# Patient Record
Sex: FEMALE | Race: WHITE
[De-identification: names, ages, dates, MRNs, and addresses within clinical notes are randomized per-mention and may not be internally consistent; named-entity substitution may affect disease eponyms.]

---

## 2020-05-14 NOTE — PR
[facility]
                                    [address]
                                  [place], [place]  [address]
_________________________________________________________________________________________
                                                                 Signed   
 
 
===================================
Progress Notes IP
===================================
Datetime Report Generated by JOSETTE: 05/14/2020 20:07
   
PROGRESS NOTES:  [id number]
Impression:  Normal progression of labor
Procedures:  Sterile Vag Exam
Plan:  Anesthesia consult
Other Plans:  trial hands and knees
Informed Consent Obtain:  Vaginal Delivery; Risks, Benefits and Alternatives Discussed
VITAL SIGNS:  [id number]
Vital Signs:  Reviewed; Within Normal Limits
EXAM:  [id number]
Dilatation:  9.0
Effacement:  80
Station:  -1
Uterine Contractions:  q 2 to6 min
MEMBRANES:  [id number]
Membrane Status:  Intact
Comments:  Progressing.  Will try different position to see if this will help with
   further dilation and continue observation of FHTs.
Fetus A:  [id number]
FHR Baseline:  145
Variability:  Moderate 6-25bpm
Accelerations:  10X10
Decelerations:  Late; Variable
FHR Category:  Category II
Presentation:  Vertex
Comments on Fetus A:  variability present though will continue close observation
Fetus B:  [id number]
Signing Physician:  Terri Lopez MD
 
 
Copies:                                
~
 
 
 
 
 
 
 
*Electronically Signed*  05/14/20 2007  TERRI LOPEZ MD            
                                                                       
_________________________________________________________________________________________
PATIENT NAME:     GREG TADEO                     
MEDICAL RECORD #: [id number]                     PROGRESS NOTE                 
          ACCT #: [id number]  
DATE OF BIRTH:   [birth year]                                       
PHYSICIAN:   TERRI LOPEZ MD                   RPT #: [id number]
REPORT IS CONFIDENTIAL AND NOT TO BE RELEASED WITHOUT AUTHORIZATION

## 2020-05-14 NOTE — PR
[facility]
                                    [address]
                                  [place], [place]  [address]
_________________________________________________________________________________________
                                                                 Signed   
 
 
===================================
Progress Notes IP
===================================
Datetime Report Generated by JOSETTE: 05/14/2020 19:13
   
PROGRESS NOTES:  [id number]
Impression:  Non-reassuring fetal heart rate
Procedures:  Sterile Vag Exam
Plan:  Anesthesia consult
Other Plans:  pit off, position changes
Informed Consent Obtain:  Vaginal Delivery; Risks, Benefits and Alternatives Discussed
VITAL SIGNS:  [id number]
Vital Signs:  Reviewed; Within Normal Limits
EXAM:  [id number]
Dilatation:  8.0
Effacement:  80
Station:  -2
Uterine Contractions:  q 2 to 5 min
MEMBRANES:  [id number]
Membrane Status:  Intact
Comments:  Recurrent decels and poor variability which is currently improved.  Will
   restart pitocin to see if baby will tolerate contractions.  She is comfortable again
   after a redose in epidural.  Will continue close observation.
Fetus A:  [id number]
FHR Baseline:  140
Variability:  Moderate 6-25bpm
Accelerations:  10X10
Decelerations:  Late
FHR Category:  Category II
Presentation:  Vertex
Comments on Fetus A:  recurrent variables _ poor variability which responded to position
   changes and D/C pit
Fetus B:  [id number]
Signing Physician:  Terri Lopez MD
 
 
Copies:                                
~
 
 
 
 
 
*Electronically Signed*  05/14/20 1913  TERRI LOPEZ MD            
                                                                       
_________________________________________________________________________________________
PATIENT NAME:     GREG TADEO                     
MEDICAL RECORD #: [id number]                     PROGRESS NOTE                 
          ACCT #: [id number]  
DATE OF BIRTH:   [birth year]                                       
PHYSICIAN:   TERRI LOPEZ MD                   RPT #: [id number]
REPORT IS CONFIDENTIAL AND NOT TO BE RELEASED WITHOUT AUTHORIZATION

## 2020-05-14 NOTE — PR
[facility]
                                    [address], [place]  [address]
_________________________________________________________________________________________
                                                                 Signed   
 
 
===================================
Progress Notes IP
===================================
Datetime Report Generated by JOSETTE: 05/14/2020 18:33
   
PROGRESS NOTES:  [id number]
Impression:  Normal progression of labor; Non-reassuring fetal heart rate
Procedures:  Sterile Vag Exam
Plan:  Anesthesia consult
Other Plans:  stop pit, redose epidural
Informed Consent Obtain:  Vaginal Delivery; Risks, Benefits and Alternatives Discussed
VITAL SIGNS:  P7390898
Vital Signs:  Reviewed; Within Normal Limits
EXAM:  [id number]
Dilatation:  8.0
Effacement:  80
Station:  -2
Uterine Contractions:  q 2 to 5 min
MEMBRANES:  [id number]
Membrane Status:  Intact
Comments:  Slowly progressing but with recurrent decels.  Will stop pit augmentation
   again and allow for recovery but restarting.  Pt updated about concerns of fetal
   tolerance for contractions.
Fetus A:  [id number]
FHR Baseline:  140
Variability:  Minimal - Undetectable to <5bpm
Accelerations:  15X15
Decelerations:  Late
FHR Category:  Category II
Presentation:  Vertex
Comments on Fetus A:  Recent lates with contractions but accel with exam which is
   reassuring
Fetus B:  [id number]
Signing Physician:  Terri Lopez MD
 
 
Copies:                                
~
 
 
 
 
 
*Electronically Signed*  05/14/20  1833  TERRI LOPEZ MD            
                                                                       
_________________________________________________________________________________________
PATIENT NAME:     GREG TADEO                     
MEDICAL RECORD #: [id number]                     PROGRESS NOTE                 
          ACCT #: [id number]  
DATE OF BIRTH:   [birth year]                                       
PHYSICIAN:   TERRI LOPEZ MD                   RPT #: [id number]
REPORT IS CONFIDENTIAL AND NOT TO BE RELEASED WITHOUT AUTHORIZATION

## 2020-05-14 NOTE — PR
[facility]
                                    [address], [place]  [address]
_________________________________________________________________________________________
                                                                 Signed   
 
 
===================================
Progress Notes IP
===================================
Datetime Report Generated by JOSETTE: 05/14/2020 13:07
   
PROGRESS NOTES:  [id number]
Impression:  Slow Progression of Labor
Procedures:  Intrauterine Pressure Catheter; Fetal Scalp Electrode
Plan:  Augmentation
Informed Consent Obtain:  Vaginal Delivery; Risks, Benefits and Alternatives Discussed
VITAL SIGNS:  A6536248
Vital Signs:  Reviewed; Within Normal Limits
EXAM:  [id number]
Dilatation:  3.5
Effacement:  90
Station:  -2
Uterine Contractions:  q 1 to 7 min
MEMBRANES:  [id number]
Membrane Status:  Intact
Comments:  Really no progress.  Will begin pit augment as contractions are inadequate at
   this time and no cervical change.
Fetus A:  [id number]
FHR Baseline:  140
Variability:  Moderate 6-25bpm
Accelerations:  15X15
Decelerations:  None
FHR Category:  Category I
Presentation:  Vertex
Comments on Fetus A:  No evidence of fetal metabolic acidosis
Fetus B:  [id number]
Signing Physician:  Terri Lopez MD
 
 
Copies:                                
~
 
 
 
 
 
 
 
 
*Electronically Signed*  05/14/20  1307  TERRI LOPEZ MD            
                                                                       
_________________________________________________________________________________________
PATIENT NAME:     GREG TADEO                     
MEDICAL RECORD #: [id number]                     PROGRESS NOTE                 
          ACCT #: [id number]  
DATE OF BIRTH:   [birth year]                                       
PHYSICIAN:   TERRI LOPEZ MD                   RPT #: [id number]
REPORT IS CONFIDENTIAL AND NOT TO BE RELEASED WITHOUT AUTHORIZATION

## 2020-05-15 NOTE — PR
[facility]
                                    [address]
                                  [place], [place]  [address]
_________________________________________________________________________________________
                                                                 Signed   
 
 
===================================
PP Progress Notes
===================================
Datetime Report Generated by JOSETTE: 05/15/2020 08:42
   
SUBJECTIVE:  [id number]
Pain:  Within normal limits
Vital Signs:  F8375001
Vital Signs:  Reviewed; Within Normal Limits
POSTPARTUM EXAM:  [id number]
Cardiovascular:  Not Done
Respiratory:  Not Done
Abdomen/Uterus:  Abnormal
Lochia:  Normal
Vulva/Perineum:  Not Done
Breasts:  Not Done
CVA Tenderness:  Not Done
Extremities:  Normal
 Incision:  Not Applicable
Breastfeeding Progress:  Normal
Exam Comments:  Fundus firm, NT @ U+1.
      
   H/H 10.7/33.7, WBC 23.4, plat 396k
IMPRESSION/PLAN/PROCEDURES:  [id number]
Impression:  Normal postpartum progression
Plan:  Continue present management
Procedures:  Rhogam
Progress Notes:  Doing well.  
Signing Physician:  Terri Lopez MD
 
 
Copies:                                
~
 
 
 
 
 
 
 
 
 
 
*Electronically Signed*  05/15/20  0842  TERRI LOPEZ MD            
                                                                       
_________________________________________________________________________________________
PATIENT NAME:     GREG TADEO                     
MEDICAL RECORD #: [id number]                     PROGRESS NOTE                 
          ACCT #: [id number]  
DATE OF BIRTH:   [birth year]                                       
PHYSICIAN:   TERRI LOPEZ MD                   RPT #: [id number]
REPORT IS CONFIDENTIAL AND NOT TO BE RELEASED WITHOUT AUTHORIZATION

## 2020-05-16 NOTE — PR
[facility]
                                    [address]
                                  [place], [place]  [address]
_________________________________________________________________________________________
                                                                 Signed   
 
 
===================================
PP Progress Notes
===================================
Datetime Report Generated by JOSETTE: 2020 09:39
   
SUBJECTIVE:  [id number]
Pain:  Within normal limits
Vital Signs:  W3151529
Vital Signs:  Reviewed; Within Normal Limits
POSTPARTUM EXAM:  [id number]
Cardiovascular:  Not Done
Respiratory:  Not Done
Abdomen/Uterus:  Abnormal
Lochia:  Normal
Vulva/Perineum:  Not Done
Breasts:  Not Done
CVA Tenderness:  Not Done
Extremities:  Normal
 Incision:  Not Applicable
Breastfeeding Progress:  Normal
Exam Comments:  Fundus firm, NT @ U-1.
IMPRESSION/PLAN/PROCEDURES:  [id number]
Impression:  Normal postpartum progression
Plan:  Discharge
Procedures:  Rhogam
Progress Notes:  Doing well.  She is ready for D/C.
Signing Physician:  Terri Lopez MD
 
 
Copies:                                
~
 
 
 
 
 
 
 
 
 
 
 
 
*Electronically Signed*  20  0939  TERRI LOPEZ MD            
                                                                       
_________________________________________________________________________________________
PATIENT NAME:     GREG TADEO                     
MEDICAL RECORD #: [id number]                     PROGRESS NOTE                 
          ACCT #: [id number]  
DATE OF BIRTH:   [birth year]                                       
PHYSICIAN:   TERRI LOPEZ MD                   RPT #: [id number]
REPORT IS CONFIDENTIAL AND NOT TO BE RELEASED WITHOUT AUTHORIZATION

## 2022-06-06 ENCOUNTER — HOSPITAL ENCOUNTER (INPATIENT)
Dept: HOSPITAL 46 - FBCO | Age: 22
LOS: 1 days | Discharge: HOME | End: 2022-06-07
Attending: OBSTETRICS & GYNECOLOGY | Admitting: OBSTETRICS & GYNECOLOGY
Payer: COMMERCIAL

## 2022-06-06 VITALS — WEIGHT: 201.94 LBS | HEIGHT: 64.02 IN | BODY MASS INDEX: 34.48 KG/M2

## 2022-06-06 DIAGNOSIS — Z20.822: ICD-10-CM

## 2022-06-06 DIAGNOSIS — O26.893: ICD-10-CM

## 2022-06-06 DIAGNOSIS — Z3A.38: ICD-10-CM

## 2022-06-06 DIAGNOSIS — Z67.11: ICD-10-CM

## 2022-06-06 DIAGNOSIS — Z87.891: ICD-10-CM

## 2022-06-06 PROCEDURE — U0003 INFECTIOUS AGENT DETECTION BY NUCLEIC ACID (DNA OR RNA); SEVERE ACUTE RESPIRATORY SYNDROME CORONAVIRUS 2 (SARS-COV-2) (CORONAVIRUS DISEASE [COVID-19]), AMPLIFIED PROBE TECHNIQUE, MAKING USE OF HIGH THROUGHPUT TECHNOLOGIES AS DESCRIBED BY CMS-2020-01-R: HCPCS

## 2022-06-06 PROCEDURE — 3E0R3BZ INTRODUCTION OF ANESTHETIC AGENT INTO SPINAL CANAL, PERCUTANEOUS APPROACH: ICD-10-PCS | Performed by: OBSTETRICS & GYNECOLOGY

## 2022-06-06 PROCEDURE — 0HQ9XZZ REPAIR PERINEUM SKIN, EXTERNAL APPROACH: ICD-10-PCS | Performed by: OBSTETRICS & GYNECOLOGY

## 2022-06-06 PROCEDURE — A9270 NON-COVERED ITEM OR SERVICE: HCPCS

## 2022-06-06 PROCEDURE — 3E0234Z INTRODUCTION OF SERUM, TOXOID AND VACCINE INTO MUSCLE, PERCUTANEOUS APPROACH: ICD-10-PCS | Performed by: OBSTETRICS & GYNECOLOGY

## 2022-06-06 PROCEDURE — 10907ZC DRAINAGE OF AMNIOTIC FLUID, THERAPEUTIC FROM PRODUCTS OF CONCEPTION, VIA NATURAL OR ARTIFICIAL OPENING: ICD-10-PCS | Performed by: OBSTETRICS & GYNECOLOGY

## 2022-06-06 PROCEDURE — 00HU33Z INSERTION OF INFUSION DEVICE INTO SPINAL CANAL, PERCUTANEOUS APPROACH: ICD-10-PCS | Performed by: OBSTETRICS & GYNECOLOGY

## 2022-06-06 NOTE — PR
[facility]
                                    [address]
                                  [place], [place]  [address]
_________________________________________________________________________________________
                                                                 Signed   
 
 
===================================
Progress Notes IP
===================================
Datetime Report Generated by CPN: 06/06/2022 07:57
   
PROGRESS NOTES:  O4949787
Impression:  Normal Progression of Labor
Procedures:  Artificial ROM; Sterile Vag Exam
Plan:  Continue Present Management
VITAL SIGNS:  B0960126
Vital Signs:  Reviewed; Within Normal Limits
EXAM:  [id number]
Dilatation:  4.0
Effacement:  75
Station:  -2
Contractions:  q 4 to 7 min
MEMBRANES:  [id number]
Comments:  Still comfortable but expect contractions to increase with AROM.  Will
   continue.
FETUS A:  K2127971
FHR Baseline:  140
Variability:  Moderate 6-25bpm
Accelerations:  15X15
Decelerations:  None
FHR Category:  Category I
Presentation:  Vertex
Comments on Fetus A:  No evidence of fetal metabolic acidosis
FETUS B:  [id number]
Signing Physician:  Terri Lopez MD
 
 
Copies:                                
~
 
 
 
 
 
 
 
 
 
 
*Electronically Signed*  06/06/22  0757  TERRI LOPEZ MD            
                                                                       
_________________________________________________________________________________________
PATIENT NAME:     GREG TADEO                 
MEDICAL RECORD #: [id number]                     PROGRESS NOTE                 
          ACCT #: [id number]  
DATE OF BIRTH:   [birth year]                                       
PHYSICIAN:   TERRI LOPEZ MD                   RPT #: [id number]
REPORT IS CONFIDENTIAL AND NOT TO BE RELEASED WITHOUT AUTHORIZATION

## 2022-06-07 NOTE — PR
[facility]
                                    [address] [place] [address]
                                  [place], [place]  [address]
_________________________________________________________________________________________
                                                                 Signed   
 
 
===================================
PP Progress Notes
===================================
Datetime Report Generated by CPPARMINDER: 2022 07:52
   
SUBJECTIVE:  [id number]
Pain:  Within Normal Limits
Vital Signs:  A0714664
Vital Signs:  Reviewed; Within Normal Limits
Cardiovascular:  Not Done
Respiratory:  Not Done
Abdomen/Uterus:  Abnormal
Lochia:  Normal
Vulva/Perineum:  Not Done
Breasts:  Not Done
CVA Tenderness:  Not Done
Extremities:  Normal
 Incision:  Not Applicable
Breastfeeding Progress:  Normal
Exam Comments:  Fundus firm, NT @ U-2
      
   H/H 11.3/34.8, WBC 17.7, plat 429k
IMPRESSION/PLAN/PROCEDURES:  [id number]
Impression:  Normal Postpartum Progression
Plan:  Discharge
Procedures:  Rhogam
Progress Notes:  Doing well.  She desires discharge today.
Signing Physician:  Terri Lopez MD
 
 
Copies:                                
~
 
 
 
 
 
 
 
 
 
 
 
*Electronically Signed*  22  0752  TERRI LOPEZ MD            
                                                                       
_________________________________________________________________________________________
PATIENT NAME:     GREG TADEO                 
MEDICAL RECORD #: [id number]                     PROGRESS NOTE                 
          ACCT #: [id number]  
DATE OF BIRTH:   [birth year]                                       
PHYSICIAN:   TERRI LOPEZ MD                   RPT #: [id number]
REPORT IS CONFIDENTIAL AND NOT TO BE RELEASED WITHOUT AUTHORIZATION

## 2023-10-29 ENCOUNTER — HOSPITAL ENCOUNTER (OUTPATIENT)
Dept: HOSPITAL 46 - ED | Age: 23
Setting detail: OBSERVATION
LOS: 2 days | Discharge: HOME | End: 2023-10-31
Attending: SURGERY | Admitting: SURGERY
Payer: COMMERCIAL

## 2023-10-29 VITALS — BODY MASS INDEX: 40.86 KG/M2 | HEIGHT: 60 IN | WEIGHT: 208.12 LBS

## 2023-10-29 DIAGNOSIS — K35.80: Primary | ICD-10-CM

## 2023-10-29 LAB
ALBUMIN SERPL-MCNC: 4.1 G/DL (ref 3.4–5)
ALBUMIN/GLOB SERPL: 1.03 {RATIO} (ref 1.1–2.4)
ALP SERPL-CCNC: 109 U/L (ref 46–116)
ALT SERPL W P-5'-P-CCNC: 19 U/L (ref 14–59)
ANION GAP SERPL CALCULATED.4IONS-SCNC: 10.2 MMOL/L (ref 7–21)
AST SERPL-CCNC: 12 U/L (ref 15–37)
BASOPHILS NFR BLD AUTO: 0.8 % (ref 0–2)
BUN SERPL-MCNC: 9 MG/DL (ref 7–18)
BUN/CREAT SERPL: 10 (ref 6–28.6)
CALCIUM SERPL-MCNC: 9 MG/DL (ref 8.5–10.1)
CHLORIDE SERPL-SCNC: 101 MMOL/L (ref 98–107)
CO2 SERPL-SCNC: 26 MMOL/L (ref 21–32)
DEPRECATED RDW RBC AUTO: 14.3 FL (ref 10.5–15)
EGFRCR SERPLBLD CKD-EPI 2021: 91 ML/MIN (ref 60–?)
EOSINOPHIL NFR BLD AUTO: 0.2 % (ref 0–6)
GLOBULIN SER-MCNC: 4 G/DL (ref 1.8–3.5)
HCT VFR BLD AUTO: 38.6 % (ref 35–50)
HGB BLD-MCNC: 12.6 G/DL (ref 12–18)
LYMPHOCYTES NFR BLD AUTO: 17.9 % (ref 24–44)
MCH RBC QN AUTO: 27.6 PG (ref 27–36)
MCHC RBC AUTO-ENTMCNC: 32.7 G/DL (ref 30–36)
MCV RBC AUTO: 84.4 FL (ref 81–99)
MONOCYTES NFR BLD AUTO: 6.7 % (ref 0–12)
NEUTROPHILS NFR BLD AUTO: 74.4 % (ref 39–80)
PLATELET # BLD AUTO: 422 K/UL (ref 140–440)
POTASSIUM SERPL-SCNC: 3.2 MMOL/L (ref 3.5–5.1)
PROT SERPL-MCNC: 8.1 G/DL (ref 6.4–8.2)
RBC # BLD AUTO: 4.58 M/UL (ref 4.3–5.7)

## 2023-10-29 PROCEDURE — C9803 HOPD COVID-19 SPEC COLLECT: HCPCS

## 2023-10-29 PROCEDURE — U0002 COVID-19 LAB TEST NON-CDC: HCPCS

## 2023-10-30 VITALS — DIASTOLIC BLOOD PRESSURE: 60 MMHG | SYSTOLIC BLOOD PRESSURE: 104 MMHG

## 2023-10-30 VITALS — SYSTOLIC BLOOD PRESSURE: 119 MMHG | DIASTOLIC BLOOD PRESSURE: 52 MMHG

## 2023-10-30 VITALS — DIASTOLIC BLOOD PRESSURE: 56 MMHG | SYSTOLIC BLOOD PRESSURE: 111 MMHG

## 2023-10-30 VITALS — DIASTOLIC BLOOD PRESSURE: 54 MMHG | SYSTOLIC BLOOD PRESSURE: 115 MMHG

## 2023-10-30 VITALS — DIASTOLIC BLOOD PRESSURE: 62 MMHG | SYSTOLIC BLOOD PRESSURE: 128 MMHG

## 2023-10-30 VITALS — DIASTOLIC BLOOD PRESSURE: 49 MMHG | SYSTOLIC BLOOD PRESSURE: 109 MMHG

## 2023-10-30 VITALS — DIASTOLIC BLOOD PRESSURE: 50 MMHG | SYSTOLIC BLOOD PRESSURE: 105 MMHG

## 2023-10-30 VITALS — SYSTOLIC BLOOD PRESSURE: 119 MMHG | DIASTOLIC BLOOD PRESSURE: 75 MMHG

## 2023-10-30 LAB
ALBUMIN SERPL-MCNC: 3.3 G/DL (ref 3.4–5)
ALBUMIN/GLOB SERPL: 0.97 {RATIO} (ref 1.1–2.4)
ALP SERPL-CCNC: 90 U/L (ref 46–116)
ALT SERPL W P-5'-P-CCNC: 14 U/L (ref 14–59)
ANION GAP SERPL CALCULATED.4IONS-SCNC: 9.3 MMOL/L (ref 7–21)
AST SERPL-CCNC: 9 U/L (ref 15–37)
BASOPHILS NFR BLD AUTO: 0.4 % (ref 0–2)
BUN SERPL-MCNC: 9 MG/DL (ref 7–18)
BUN/CREAT SERPL: 10.46 (ref 6–28.6)
CALCIUM SERPL-MCNC: 8.7 MG/DL (ref 8.5–10.1)
CHLORIDE SERPL-SCNC: 104 MMOL/L (ref 98–107)
CO2 SERPL-SCNC: 26 MMOL/L (ref 21–32)
DEPRECATED RDW RBC AUTO: 14.3 FL (ref 10.5–15)
EGFRCR SERPLBLD CKD-EPI 2021: 96 ML/MIN (ref 60–?)
EOSINOPHIL NFR BLD AUTO: 0.4 % (ref 0–6)
EPI CELLS #/AREA URNS HPF: (no result) /LPF
GLOBULIN SER-MCNC: 3.4 G/DL (ref 1.8–3.5)
HCT VFR BLD AUTO: 33.8 % (ref 35–50)
HGB BLD-MCNC: 11 G/DL (ref 12–18)
HGB UR QL STRIP: (no result)
KETONES UR QL STRIP: (no result)
LEUKOCYTE ESTERASE UR QL STRIP: (no result)
LYMPHOCYTES NFR BLD AUTO: 21.4 % (ref 24–44)
MCH RBC QN AUTO: 27.8 PG (ref 27–36)
MCHC RBC AUTO-ENTMCNC: 32.6 G/DL (ref 30–36)
MCV RBC AUTO: 85.4 FL (ref 81–99)
MONOCYTES NFR BLD AUTO: 7.6 % (ref 0–12)
NEUTROPHILS NFR BLD AUTO: 70.2 % (ref 39–80)
NITRITE UR QL STRIP: NEGATIVE
PLATELET # BLD AUTO: 375 K/UL (ref 140–440)
POTASSIUM SERPL-SCNC: 3.3 MMOL/L (ref 3.5–5.1)
PROT SERPL-MCNC: 6.7 G/DL (ref 6.4–8.2)
RBC # BLD AUTO: 3.96 M/UL (ref 4.3–5.7)

## 2023-10-30 PROCEDURE — 0DTJ0ZZ RESECTION OF APPENDIX, OPEN APPROACH: ICD-10-PCS | Performed by: SURGERY

## 2023-10-30 NOTE — NUR
PT AWAKE, ROOM AIR, DENIES C/O N/V OR ABD PAIN. IVF INFUSING, COOP WITH
VITALS. UP TO BRP, VOIDED DARK COLORED YELLOW URINE. BACK TO BED. TOLERATED
WELL. NPO, ORAL SWABS AT HANDS REACH

## 2023-10-30 NOTE — NUR
PT WAKES TO VOICE. DENIES PAIN OR NAUSEA. WATCHES TV AND VISITS WITH SPOUSE.
NO CHANGES TO ABDOMINAL INCISIONS X3. CALL LIGHT IN REACH, NO REQUESTS AT THIS
TIME.

## 2023-10-30 NOTE — NUR
REPORT RECEIVED FROM HIMANSHU DOYLE. pt RESTING IN BED WITH EYES CLOSED. .
SPO2 95% ON RA. NO DISTRESS NOTED. IVF INFUSING AS ORDERED.

## 2023-10-30 NOTE — OR
[facility]
                                    [address], [place]  [address]
_________________________________________________________________________________________
                                                                 Signed   
 
 
DATE OF OPERATION:
10/30/2023
 
SURGEON:
Ruben Balderas MD
 
PREOPERATIVE DIAGNOSIS:
Acute appendicitis.
 
POSTOPERATIVE DIAGNOSES:
1. Acute appendicitis without perforation.
2. Abdominal obesity.
 
PROCEDURE:
Laparoscopic appendectomy.
 
ANESTHESIA:
General endotracheal, Ruben Saavedra CRNA and local 10 mL of 0.25% Marcaine with
epinephrine. 
 
INDICATION:
This is a 23-year-old G2 white woman, who presented to the emergency room late last
night and evaluated by Dr. Anderson with progressive pain in the lower abdomen.  It
began at 2:00 a.m. the night before in the upper abdomen.  Her white count was elevated
to 14.5 and a CT scan of the abdomen confirmed findings consistent with acute
appendicitis.  Her beta HCG is negative and urinalysis normal.  She has been fluid
resuscitated, given intravenous antibiotics today and now to undergo appendectomy
preferred by a laparoscopic approach.  The risk of bleeding, infection, need for other
indicated procedures, need for open procedure and so forth all reviewed with her. She
understands and wished to proceed. 
 
FINDINGS:
Indeed she did have acute suppurative appendicitis, but without sign of perforation of
well-formed abscess. 
Appendectomy was performed without problem.  The terminal ileum was normal.  The liver
was normal.  The gallbladder appeared to have chronic inflammatory change, but no acute
inflammation at this time.  Appendectomy was performed without complication and without
significant blood loss. 
 
DESCRIPTION OF PROCEDURE:
The patient was brought to the operating room, given a general endotracheal anesthetic.
Preoperative antibiotic Ancef and Flagyl had been given.  Sequential compression device
 
    Electronically Signed By: RUBEN BALDERAS MD  10/30/23 2008
_________________________________________________________________________________________
PATIENT NAME:     GREG TADEO                  
MEDICAL RECORD #: [id number]            OPERATIVE REPORT              
          ACCT #: [id number]  
DATE OF BIRTH:   [birth year]            REPORT #: [id number]      
PHYSICIAN:        RUBEN BALDERAS MD                 
PCP:              NO PRIMARY CARE PHYSICIAN     
REPORT IS CONFIDENTIAL AND NOT TO BE RELEASED WITHOUT AUTHORIZATION
 
 
                                  [facility]
                                    [address], [place]  [address]
_________________________________________________________________________________________
                                                                 Signed   
 
 
stockings were used and heparin subcutaneously administered.  After satisfactory general
endotracheal anesthesia, the abdomen was prepared with a chlorhexidine solution and
draped sterilely.  An infraumbilical incision was made and using an open Lio cannula
technique, pneumoperitoneum was achieved to a level of 14 mmHg of carbon dioxide gas.
Intra-abdominal inspection showed no sign of ascites or carcinomatosis.  The small bowel
loops in the right lower abdomen are rather inflamed. Appendix at that time was not
visualized.  The liver appeared normal.  There appeared to be chronic inflammatory
change of the gallbladder.  There was no sign of acute inflammation. 
 
Under direct visualization, a 12 mm epigastric port was placed and a camera WAS placed
to that site.  Single hand manipulation of the right lower abdomen allowed for
identification of the cecum and the ileum easily identifying a suppurative thickened
appendix clearly with appendicitis, but no sign of perforation or actual abscess. 
 
The right lower quadrant 5 mm port was placed and with two hand manipulation window
created between the appendix and the cecum.  Endo-GURWINDER stapling device with vascular load
was used to transect the base of the appendix flushed with the cecum. Mobility of the
antimesenteric fat pad of trieve from the mesentery of the appendix itself was
undertaken and once isolated, an Endo GURWINDER load was applied across this and another load
to fully complete the transection.  There was no bleeding particularly.  The appendix
was placed in an endobag, given her abdominal wall obesity and purulent appearance.
This was extracted and revealed on the back table and later photographed. 
 
Irrigation was undertaken of the right lower quadrant.  There was minimal oozing at the
staple line and one of the sites and this was easily secured with electrocautery.
Excess irrigation fluid was suctioned free.  The camera was replaced to the
infraumbilical port.  Examination of the upper abdomen undertaken allowing for
suctioning of excess irrigation fluid.  Better visualization of the gallbladder was
undertaken showing chronic inflammatory change with no sign of acute inflammation. 
 
The epigastric port was removed and it showed a small amount of oozing and on that basis
direct application of cautery through the trocar site was used to secure getting good
hemostasis.  The right lower quadrant trocar was subsequently removed under direct
visualization showing no sign of bleeding. 
 
The infraumbilical fascial incision was then reapproximated with interrupted 0 Vicryl
suture.  Additionally, supported by a running 0 PDS suture.  Irrigation was undertaken
in the wound.  10 mL of 0.25% Marcaine with epinephrine injected locally and skin closed
with interrupted 3-0 Vicryl.  Steri-Strips were applied.  The patient was ultimately
extubated and transferred to recovery room in good condition having suffered no
complications.  Sponge, needle, and instrument counts were reported as correct x3. 
 
 
    Electronically Signed By: RUBEN BALDERAS MD  10/30/23 2008
_________________________________________________________________________________________
PATIENT NAME:     GREG TADEO                  
MEDICAL RECORD #: [id number]            OPERATIVE REPORT              
          ACCT #: [id number]  
DATE OF BIRTH:   [birth year]            REPORT #: [id number]      
PHYSICIAN:        RUBEN BALDERAS MD                 
PCP:              NO PRIMARY CARE PHYSICIAN     
REPORT IS CONFIDENTIAL AND NOT TO BE RELEASED WITHOUT AUTHORIZATION
 
 
                                  [address]  [address]
_________________________________________________________________________________________
                                                                 Signed   
 
 
 
 
            ________________________________________
            MD PAYAL Kay/MODL
Job #:  [id number]
DD:  10/30/2023 16:49:12
DT:  10/30/2023 17:20:48
 
cc:            Dr. Luiz Anderson
 
 
Copies:                                
~
 
 
 
 
 
 
 
 
 
 
 
 
 
 
 
 
 
 
 
 
 
 
 
 
 
 
 
    Electronically Signed By: RUBEN BALDERAS MD  10/30/23 2008
_________________________________________________________________________________________
PATIENT NAME:     CARLYLEGREG FOSTER                  
MEDICAL RECORD #: [id number]            OPERATIVE REPORT              
          ACCT #: [id number]  
DATE OF BIRTH:   [birth year]            REPORT #: [id number]      
PHYSICIAN:        RUBEN BALDERAS MD                 
PCP:              NO PRIMARY CARE PHYSICIAN     
REPORT IS CONFIDENTIAL AND NOT TO BE RELEASED WITHOUT AUTHORIZATION

## 2023-10-30 NOTE — NUR
VERBAL REPORT RECEIVED FROM HIMANSHU COLLAZO. PT RESTING IN BED WITH EYES CLOSED,
RESP EVEN AND UNLABORED.

## 2023-10-30 NOTE — NUR
pt SLEEPING, AWAKENS TO VOICE. ASSESSMENT COMPLETE. STERI STRIPS IN PLACE,
LAP SITES X3, MINIMAL DRIED SANGUINOUS
DRAINAGE ON UMBILICUS AND EPIGASTRIC SITES. SBA TO BSC FOR VOID AND BACK TO
BED. VS STABLE. CALL LIGHT AND PERSONAL SUPPLIES IN REACH. ICE WATER PROVIDED.

## 2023-10-30 NOTE — NUR
PT ADMITTED TO SOME NERVOUSNESS ABOUT SURGERY BUT WAS CONFIDENT IN CARE TO BE
RECEIVED.  GAVE REASSURANCE.  PT CONSENTED TO PRAYER.  PRAYED FOR SUCCESSFUL
PROCEDURE AND AWARENESS OF DIVINE PRESENCE.

## 2023-10-30 NOTE — NUR
POST OP VS COMPLETE.  PT STATES NO PAIN AT THIS TIME.  RA, SATS IN THE 90'S.
LAP SITES CONTINUE COVERED WITH SS,

## 2023-10-30 NOTE — HP
[facility]
                                    [address], [place]  [address]
_________________________________________________________________________________________
                                                                 Signed   
 
 
ADMISSION DATE:  
10/29/2023
 
REASON FOR ADMISSION:  
Probable appendicitis.
 
HISTORY OF PRESENT ILLNESS:  
This 23-year-old white woman who is  and accompanied by her  and has two
children, ages 3 and 1.  She lives in [place], [place]. 
 
At 2:30 a.m. more than 24 hours ago, she began having central upper abdominal pain,
which was unrelenting.  She tried Pepto-Bismol, which was somewhat helpful, but then
pain migrated to the lower abdomen, both right and left lower abdomen area.  She
presented to the emergency room at approximately 10:30 last night where she was
evaluated by Dr. Anderson.  This included an ultrasound of the abdomen confirming high
probability of acute appendicitis.  There is no evidence of bowel obstruction, terminal
ileitis, or other abnormality.  Her white count was elevated to 19.4, and urinalysis was
essentially negative.  Beta hCG was reported as negative as well.  She has an impending
menstrual period soon she thinks as well.  She was admitted for further evaluation and
care. 
 
PAST MEDICAL HISTORY:  
Notable only for childbirth.
 
ALLERGIES:  
Allergies to penicillin and self-described allergy to Benadryl.
 
PAST SURGICAL HISTORY:  
She has never had surgery in the past.
 
SOCIAL HISTORY:  
She does not smoke.  She does not work outside the home.
 
REVIEW OF SYSTEMS:  
Denies any shortness of breath or chest pain.  She has had no dysphagia, dysuria,
hematemesis or hematuria. 
 
PHYSICAL EXAMINATION:
GENERAL:  Somewhat obese white woman accompanied by her  at this time.  She does
not look systemically toxic. 
VITAL SIGNS:  Temperature at presentation was 98.1, pulse 117, blood pressure 124/79, O2
saturation 99% on room air.  Current vital signs show a temperature of 98, pulse 92,
 
    Electronically Signed By: RUBEN BALDERAS MD  10/30/23 2008
_________________________________________________________________________________________
PATIENT NAME:     GREG TADEO                  
MEDICAL RECORD #: [id number]            HISTORY AND PHYSICAL          
          ACCT #: [id number]  
DATE OF BIRTH:   [birth year]            REPORT #: [id number]      
PHYSICIAN:        RUBEN BALDERAS MD                 
PCP:              NO PRIMARY CARE PHYSICIAN     
REPORT IS CONFIDENTIAL AND NOT TO BE RELEASED WITHOUT AUTHORIZATION
 
 
                                  [facility]
                                    [address], [place]  [address]
_________________________________________________________________________________________
                                                                 Signed   
 
 
blood pressure 119/52, O2 saturation on room air is 98%. 
HEENT:  Trachea is midline.  Mucous membranes are moist. 
CHEST:  Clear. 
HEART:  Regular.  I do not detect a murmur, though the patient describes prior history
of having murmur./ 
ABDOMEN:  Somewhat obese.  Rovsing sign is positive.  There is tenderness in the right
lower quadrant. 
EXTREMITIES:  Show no clubbing, cyanosis, or edema.
 
LABORATORY STUDIES:  
Show an initial white count of 19.4, this morning 14.5; hematocrit of 33.8, previously
38.6; platelets 375,000.  Chem profile shows potassium now 3.3, otherwise electrolytes
are normal.  Liver enzymes are normal.  Beta hCG negative.  Urinalysis showed a trace of
ketones, otherwise normal.  Serology negative for Corona virus.  I have reviewed the CT
scan in detail and reviewed the report as well.  I faintly see what likely is the
appendix, which is oriented medially and in the posterior aspect of the cecum.
Interpretation of the CT showed findings suspicious for acute uncomplicated appendicitis
without sign of bowel obstruction.  The appendix is considered dilated within mild
adjacent inflammatory stranding.  Appendix measuring 12 mm in size. 
 
ASSESSMENT:  
The patient likely has acute non-perforated appendicitis.  Discussed the pathophysiology
of the problem with the patient and her , they understand.  I have recommended an
appendectomy.  Non-operative approaches are consideration, but less likely would be
durably effective and on that basis my recommendation is primarily appendectomy.  The
laparoscopic approach would be preferred though an open approach may be required.  I
discussed the risk of bleeding, infection, missed diagnosis, failure of diagnosis and
need for other indicated procedures, particularly if appendix is normal.  I would still
recommend it to be removed and evaluation and treatment of other findings if necessary.
They agreed to this. 
 
PLAN:  
We will provide for laparoscopy and laparoscopic appendectomy today.
 
 
 
            ________________________________________
            MD PAYAL Kay/ASHLIE
Job #:  [id number]
 
    Electronically Signed By: RUBEN BALDERAS MD  10/30/23 2008
_________________________________________________________________________________________
PATIENT NAME:     GREG TADEO                  
MEDICAL RECORD #: [id number]            HISTORY AND PHYSICAL          
          ACCT #: [id number]  
DATE OF BIRTH:   [birth year]            REPORT #: [id number]      
PHYSICIAN:        RUBEN BALDERAS MD                 
PCP:              NO PRIMARY CARE PHYSICIAN     
REPORT IS CONFIDENTIAL AND NOT TO BE RELEASED WITHOUT AUTHORIZATION
 
 
                                  [facility]
                                    [address], [place]  [address]
_________________________________________________________________________________________
                                                                 Signed   
 
 
DD:  10/30/2023 09:28:21
DT:  10/30/2023 09:56:50
 
cc:            Dr. Anderson
 
 
Copies:                                
~
 
 
 
 
 
 
 
 
 
 
 
 
 
 
 
 
 
 
 
 
 
 
 
 
 
 
 
 
 
 
 
 
 
 
 
    Electronically Signed By: RUBEN BALDERAS MD  10/30/23 2008
_________________________________________________________________________________________
PATIENT NAME:     GREG TADEO                  
MEDICAL RECORD #: [id number]            HISTORY AND PHYSICAL          
          ACCT #: [id number]  
DATE OF BIRTH:   [birth year]            REPORT #: [id number]      
PHYSICIAN:        RUBEN BALDERAS MD                 
PCP:              NO PRIMARY CARE PHYSICIAN     
REPORT IS CONFIDENTIAL AND NOT TO BE RELEASED WITHOUT AUTHORIZATION

## 2023-10-30 NOTE — NUR
10/30/23 1648 Opal Gould
1640-PT ARRIVES TO PACU ON 6 L MASK WITH ORAL AIRWAY IN PLACE. PT
UNRESPONSIVE TO PAINFUL/VERBAL STIMULI. CRNA POZAR AT BEDSIDE FOR
REPORT. S
 
4682-[facility] REMAINS IN PLACE

## 2023-10-30 NOTE — NUR
SPOKE TO TELEPHARMACY REGARDING CONFLICT NOTED W/ ANCEF ABX. PER TELEPHARMACY
OKAY TO NICK W/ SCHEDULED ADMINISTRATION, CONFLICT D/T ADVERSE REACTION ON
PENICILLINS- MONITOR FOR CROSS SENSITIVITY W/ ANCEF.

## 2023-10-30 NOTE — NUR
Called [facility].  They request I call [facility] as
they do not have any openings.
 
Sent request to [facility] requesting PCP and emailed the
chart to Lashell Zaman CHW and Kori Everett W requesting a pcp for this pt.

## 2023-10-30 NOTE — NUR
PRE-SURGICAL CHLORHEXADINE CLEANSE PROVIDED, NEW GOWN DONED. PT MENSTRATING,
BREIF AND PAD PROVIDED. PT TO BATHROOM WITH STANDBY ASSIST FOR LINES. PT VOIDS
AND APPLIES BREIF AND PAD. 150MLS OF ORANGE CONCENTRATED URINE NOTED. PT BACK
TO BED. NO REQUESTS AT THIS TIME.

## 2023-10-30 NOTE — NUR
1744- PT RETURNS TO MED-SURG, ROOM 8 VIA BED, ESCORTED BY HIMANSHU GRAJEDA. VS
OBTAINED, STABLE. PULSE TACHY  S/P EPHEDRINDE IN THE OR. CONTINUOUS
PULSE
OXIMETER IN PLACE. PT DROWSY BUT RESPONDS TO VERBAL CUES AND CAN FOLLOW
INSTRUCTIONS BUT THEN GOES BACK TO RESTING WITH EYES CLOSED, RESP EVEN AND
UNLABORED. SPOUSE AT BEDSIDE IN ROOM. SCD'S IN PLACE FROM KNEES TO ANKLES ON
BLE. HRR, LUNGS CLEAR, ABDOMEN MILDLY DISTENDED AND SWOLLEN. INCISION SITES
X3: EPIGASTRIC REGION, AT UMBILICUS AND RLQ. STERI STRIPS IN PLACE OVER EACH
SITE. SMALL AMOUNT OF SEROSANGUINEOUS DRAINAGE NOTED AT UMBILICUS AND
EPIGASTRIC REGION, PER VERBAL REPORT FROM HIMANSHU GRAJEDA, THIS DRAINAGE IS
UNCHANGED SINCE HER ARRIVAL TO PACU. NO ACTIVE DRAINING AT THIS TIME. 20G IV
IN RAC PATENT, LR AT 85 INFUSING. NO REDNESS, SWELLING OR LEAKING NOTED AT
SITE. NO REQUESTS AT THIS TIME.

## 2023-10-30 NOTE — NUR
Spoke with Liliana.  She states she currently lives with her spouse and 2
children with her parents in [place]. They live in  house, no issues getting in
or out of the home.  Pt does not use any DME.  She does not drive, but family
transport her.  She does not have a PCP, but is will to have a pcp from
[facility] or [facility]. I will call both to check
for availability, Pt denies other needs. She denies financial difficulties as
spouse is working.  She is a stay at home mom. She plans on dc to home as soon
as she is medically cleared.

## 2023-10-30 NOTE — NUR
pt ARRIVED TO [facility] FLOOR, ORIENTED TO ROOM. VSS, CALL LIGHT IN REACH. pt
CURRENTLY DENIES PAIN. pt NPO FOR SCHEDULED APPY. CALL LIGHT IN REACH. 
GORGE AT BEDSIDE, PREPARING TO GO HOME FOR THE EVENING. CHARGE HIMANSHU DIAZ
COMPLETING ADMISSION.

## 2023-10-30 NOTE — NUR
POST OP VS COMPLETE. pt RESTING IN BED. CONTINUES TO DENY PAIN. STERI STRIPS
INTACT ON LAP SITES X3, NO NEW DRAINAGE. SCHEDULED MEDICATIONS ADMINISTERED.
CALL LIGHT IN REACH.

## 2023-10-30 NOTE — NUR
0100 - ADMITTED TO ROOM 108 FROM ED, VIA STRETCHER, AMBULATED FROM HALLWAY TO
BED, ALERT AND ORIENTED, COOPERATIVE WITH ADMIT ASSESSMENTS AND QUESTIONS,
DENIES C/O PAIN OR N/V AT THIS TIME, COMPLETED ER FLAGYL. DENIES S/E TO ABX,
IVF INFUSING. ORIENTED TO ROOM, AWARE OF NPO STATUS, INSTRUCTIONS ON ORAL
SWABS AND PRE-POST OP EXPECTATIONS, QUESTIONS ANSWERED. COOPERATIVE,
RECEPTIVE. SWABS AND CALL LIGHT AT HANDS REACH.

## 2023-10-31 VITALS — SYSTOLIC BLOOD PRESSURE: 123 MMHG | DIASTOLIC BLOOD PRESSURE: 63 MMHG

## 2023-10-31 VITALS — SYSTOLIC BLOOD PRESSURE: 116 MMHG | DIASTOLIC BLOOD PRESSURE: 55 MMHG

## 2023-10-31 NOTE — NUR
PT EATS 100% OF BREAKFAST, TOLERATES THIS WELL, DENIES NAUSEA. ORAL PAIN
MEDICATION RECIEVED, SEE eMAR. PT WATCHES TV, SPOUSE AND YOUNG DAUGHTER AT
BEDSIDE. FLAGYL INFUSING VIA IV. NO REQUESTS AT THIS TIME.

## 2023-10-31 NOTE — NUR
STUDENT NURSE NOTE
PT. CALLED REQUESTED ASSISTANCE WITH IV PUMP AND TO ORDER BREAKFAST. IV PUMP
HAD DISTAL OCCLUSION, IV SITE OBSERVED AND INSPECTED FOR SIGNS OF INFILTRATION
AND PHLEBITIS. NO SIGNS OBSERVED. IV FLUIDS RE-STARTED, PT. PRIMARY NURSE
NOTIFIED. PT ASSISTED WITH BREAKFAST ORDER. CALL LIGHT WITHIN REACH. NO OTHER
NEEDS AT THIS TIME.

## 2023-10-31 NOTE — NUR
VERBAL REPORT RECEIVED FROM HIMANSHU BYRNE AND HIMANSHU LR. PT AWAKE AND ALERT,
WATCHES TV, REPORTS ABDOMINAL PAIN 1/10, TOLERABLE AT THIS TIME AND DENIES
NAUSEA. NO REQUESTS.

## 2023-10-31 NOTE — NUR
IV IN RAC REMOVED, TIP INTACT. GAUZE AND TAPE DRESSING APPLIED TO SITE. VSS.
PT DENIES NAUSEA OR PAIN. PT DRESSES SELF. PT ESCORTED OFF UNIT IN WHEEL CHAIR
PRIVATE CAR DRIVEN BY SPOUSE.

## 2023-10-31 NOTE — NUR
pt SLEEPING, AWAKENS TO VOICE. IV SITE ASSESSED, IV ANTIBIOTIC INFUSING WNL.
pt DENIES PAIN. SBA TO RESTROOM FOR VOID AND BACK TO BED. CALL LIGHT IN REACH.
BREAKFAST MENU AND PHONE PROVIDED TO PATIENT.

## 2023-10-31 NOTE — NUR
CALL LIGHT ANSWERED. IV PUMP ALARMING DISTAL OCCLUSION. SITE ASSESSED, IVF
INFUSING WNL AS ORDERED. VSS. ASSESSMENT COMPLETE. pt CONTINUES TO DENY PAIN.
UMBILICUS LAP SITE DRAINING SMALL AMT SANGUINOUS DRAINAGE THROUGH TO GOWN,
DRESSING REINFORCED WITH NON-ADHERANT GAUZE. NEW GOWN APPLIED. SBA TO RESTROOM
FOR VOID AND BACK TO BED. CALL LIGHT IN REACH.

## 2023-11-01 NOTE — PATH
[facility]
                                    [address], [place]  [address]
_________________________________________________________________________________________
                                                                 Signed   
 
 
 
SPECIMEN(S): A APPENDIX
 
SPECIMEN SOURCE:
A. APPENDIX
 
CLINICAL HISTORY:
Appendicitis.
 
FINAL PATHOLOGIC DIAGNOSIS:
Appendix, appendectomy:
-  Acute suppurative appendicitis, periappendicitis, and serositis.
-  Extensive mucosal necrosis.
JVR:llc
 
MICROSCOPIC EXAMINATION:
Histologic sections of all submitted blocks are examined by light microscopy.  
These findings, together with the gross examination, support the pathologic 
diagnosis. 
 
GROSS DESCRIPTION:
The specimen, labeled and designated "kim Tadeo," is received in 
formalin and consists of 
Specimen: Appendix with mesoappendix.
Dimensions: 6.0 x 0.8 cm.
Serosa: Pink-tan, smooth and partially covered with a yellow-tan, adherent, 
cloudy, plaque-like material. 
Defect: Not grossly identified.
Inking: Staple line is inked black.
Mucosa: Pink-red.
Fecalith: Not grossly identified.
Additional: None.
Representative sections are submitted in (A1).
JS  (under the direct supervision of a pathologist)
The Gross Description was prepared using a voice recognition system. The report 
was reviewed for accuracy; however, sound-alike word errors, addition and/or 
deletions may occur. If there is any 
question about this report, please contact Client Services.
 
PERFORMING LABORATORY:
Technical component was performed by [facility], [address] [place] [id number] (CLIA# [id number]).   Professional interpretation was 
 
                                                                                    
_________________________________________________________________________________________
PATIENT NAME:     GREG TADEO                  
MEDICAL RECORD #: [id number]            PATHOLOGY                     
          ACCT #: [id number]       ACCESSION #: [id number]     
DATE OF BIRTH:   [birth year]            REPORT #: [id number]       
PHYSICIAN:        [facility]              
PCP:              NO PRIMARY CARE PHYSICIAN     
REPORT IS CONFIDENTIAL AND NOT TO BE RELEASED WITHOUT AUTHORIZATION
 
 
                                  [address]
                                  [place], [place]  [address]
_________________________________________________________________________________________
                                                                 Signed   
 
 
performed by [facility] [address] [address] (CLIA#: [id number]).
 
Diagnostician:  Macario Khan MD
Pathologist
Electronically Signed 11/01/2023
 
 
Copies:                                
~
 
 
 
 
 
 
 
 
 
 
 
 
 
 
 
 
 
 
 
 
 
 
 
 
 
 
 
 
 
 
 
 
 
                                                                                    
_________________________________________________________________________________________
PATIENT NAME:     GREG TADEO                  
MEDICAL RECORD #: [id number]            PATHOLOGY                     
          ACCT #: [id number]       ACCESSION #: [id number]     
DATE OF BIRTH:   [birth year]            REPORT #: [id number]       
PHYSICIAN:        [facility]              
PCP:              NO PRIMARY CARE PHYSICIAN     
REPORT IS CONFIDENTIAL AND NOT TO BE RELEASED WITHOUT AUTHORIZATION

## 2024-11-06 NOTE — PR
Pt arrived ambulatory to ED c/o abd pain and back pain. Pt reports he generally has to self catheterize, but has a boucher now and pain has been getting worse. Boucher bag has urine draining and pt denies any leaking.    [facility]
                                    [address] [facility]
                                  [place], [place]  [address]
_________________________________________________________________________________________
                                                                 Signed   
 
 
===================================
Progress Notes IP
===================================
Datetime Report Generated by JOSETTE: 05/14/2020 17:34
   
PROGRESS NOTES:  [id number]
Impression:  Slow Progression of Labor
Procedures:  Sterile Vag Exam
Plan:  Augmentation
Other Plans:  restart pit
Informed Consent Obtain:  Vaginal Delivery; Risks, Benefits and Alternatives Discussed
VITAL SIGNS:  [id number]
Vital Signs:  Reviewed; Within Normal Limits
EXAM:  [id number]
Dilatation:  7.0
Effacement:  90
Station:  -2
Uterine Contractions:  q 3 to 6 min
MEMBRANES:  [id number]
Membrane Status:  Intact
Comments:  Progressing slowly.  Fetal status is improved currently so I feel restarting
   low dose pitocin is reasonable.  Will continue close observation.
Fetus A:  [id number]
FHR Baseline:  140
Variability:  Moderate 6-25bpm
Accelerations:  15X15
Decelerations:  Early; Late
FHR Category:  Category II
Presentation:  Vertex
Comments on Fetus A:  overall reassuring with accels
Fetus B:  [id number]
Signing Physician:  Terri Lopez MD
 
 
Copies:                                
~
 
 
 
 
 
 
 
*Electronically Signed*  05/14/20  1734  TERRI LOPEZ MD            
                                                                       
_________________________________________________________________________________________
PATIENT NAME:     GREG TADEO                     
MEDICAL RECORD #: [id number]                     PROGRESS NOTE                 
          ACCT #: [id number]  
DATE OF BIRTH:   [birth year]                                       
PHYSICIAN:   TERRI LOPEZ MD                   RPT #: [id number]
REPORT IS CONFIDENTIAL AND NOT TO BE RELEASED WITHOUT AUTHORIZATION